# Patient Record
Sex: MALE | Race: OTHER | Employment: OTHER | URBAN - METROPOLITAN AREA
[De-identification: names, ages, dates, MRNs, and addresses within clinical notes are randomized per-mention and may not be internally consistent; named-entity substitution may affect disease eponyms.]

---

## 2022-02-03 ENCOUNTER — NEW PATIENT (OUTPATIENT)
Dept: URBAN - METROPOLITAN AREA CLINIC 13 | Facility: CLINIC | Age: 77
End: 2022-02-03

## 2022-02-03 DIAGNOSIS — H43.313: ICD-10-CM

## 2022-02-03 DIAGNOSIS — H52.11: ICD-10-CM

## 2022-02-03 PROCEDURE — 92250 FUNDUS PHOTOGRAPHY W/I&R: CPT

## 2022-02-03 PROCEDURE — 92004 COMPRE OPH EXAM NEW PT 1/>: CPT

## 2022-02-03 PROCEDURE — 92015 DETERMINE REFRACTIVE STATE: CPT

## 2022-02-03 ASSESSMENT — TONOMETRY
OS_IOP_MMHG: 15
OD_IOP_MMHG: 15

## 2022-02-03 ASSESSMENT — VISUAL ACUITY
OD_CC: 20/70
OU_CC: 20/60
OS_CC: 20/60
OD_SC: 20/150
OU_SC: 20/60-2
OS_SC: 20/60

## 2022-02-03 ASSESSMENT — KERATOMETRY
OD_K2POWER_DIOPTERS: 43.00
OS_AXISANGLE2_DEGREES: 45
OD_AXISANGLE2_DEGREES: 46
OD_K1POWER_DIOPTERS: 43.25
OS_K1POWER_DIOPTERS: 42.00
OD_AXISANGLE_DEGREES: 136
OS_K2POWER_DIOPTERS: 42.50
OS_AXISANGLE_DEGREES: 135

## 2022-02-03 NOTE — PATIENT DISCUSSION
Told patient if he updates glasses, he will still not be able to see as clearly. We can send referral out today for sx, or we can wait. It is  up to pt.

## 2022-02-23 ENCOUNTER — ESTABLISHED PATIENT (OUTPATIENT)
Dept: URBAN - METROPOLITAN AREA CLINIC 14 | Facility: CLINIC | Age: 77
End: 2022-02-23

## 2022-02-23 DIAGNOSIS — H25.13: ICD-10-CM

## 2022-02-23 PROCEDURE — 92136 OPHTHALMIC BIOMETRY: CPT

## 2022-02-23 PROCEDURE — 99214 OFFICE O/P EST MOD 30 MIN: CPT

## 2022-02-23 ASSESSMENT — VISUAL ACUITY
OD_BCVA: 20/40
OS_SC: 20/80
OS_BCVA: 20/50
OD_SC: 20/400

## 2022-02-23 ASSESSMENT — TONOMETRY
OD_IOP_MMHG: 10
OS_IOP_MMHG: 10

## 2022-02-23 ASSESSMENT — KERATOMETRY
OS_AXISANGLE_DEGREES: 135
OD_K2POWER_DIOPTERS: 43.00
OS_AXISANGLE2_DEGREES: 45
OD_AXISANGLE2_DEGREES: 46
OD_AXISANGLE_DEGREES: 136
OS_K1POWER_DIOPTERS: 42.00
OS_K2POWER_DIOPTERS: 42.50
OD_K1POWER_DIOPTERS: 43.25

## 2022-02-23 NOTE — PATIENT DISCUSSION
Visually significant.  Schedule cataract surgery OD (pt feels the right eye is worse) and then OS if symptoms persist.

## 2022-02-23 NOTE — PATIENT DISCUSSION
The patient desires to be less dependent on glasses. He would like to learn more about research study options.

## 2022-03-02 ENCOUNTER — CLINICAL TRIAL VISIT (OUTPATIENT)
Dept: URBAN - METROPOLITAN AREA CLINIC 14 | Facility: CLINIC | Age: 77
End: 2022-03-02

## 2022-03-02 DIAGNOSIS — H25.13: ICD-10-CM

## 2022-03-02 PROCEDURE — 99199CT CLINICAL TRIAL VISIT

## 2022-03-02 NOTE — PATIENT DISCUSSION
Patient does not qualify; therefore, has not been enrolled in the study. Patient not evaluated by Dr. Leti Allison. Research screening only.

## 2022-03-10 ENCOUNTER — POST-OP (OUTPATIENT)
Dept: URBAN - METROPOLITAN AREA CLINIC 14 | Facility: CLINIC | Age: 77
End: 2022-03-10

## 2022-03-10 DIAGNOSIS — Z96.1: ICD-10-CM

## 2022-03-10 DIAGNOSIS — H25.12: ICD-10-CM

## 2022-03-10 PROCEDURE — 99024 POSTOP FOLLOW-UP VISIT: CPT

## 2022-03-10 PROCEDURE — 92136 OPHTHALMIC BIOMETRY: CPT

## 2022-03-10 ASSESSMENT — TONOMETRY: OD_IOP_MMHG: 15

## 2022-03-10 ASSESSMENT — VISUAL ACUITY
OS_BCVA: 20/50
OD_SC: 20/40

## 2022-03-24 ENCOUNTER — POST-OP (OUTPATIENT)
Dept: URBAN - METROPOLITAN AREA CLINIC 13 | Facility: CLINIC | Age: 77
End: 2022-03-24

## 2022-03-24 DIAGNOSIS — Z96.1: ICD-10-CM

## 2022-03-24 PROCEDURE — 99211NC NO CHARGE VISIT

## 2022-03-24 ASSESSMENT — VISUAL ACUITY
OS_SC: 20/30-2
OU_SC: 20/25
OD_SC: 20/25-2

## 2022-03-24 ASSESSMENT — TONOMETRY
OD_IOP_MMHG: 14
OS_IOP_MMHG: 14

## 2022-04-14 ENCOUNTER — POST-OP (OUTPATIENT)
Dept: URBAN - METROPOLITAN AREA CLINIC 13 | Facility: CLINIC | Age: 77
End: 2022-04-14

## 2022-04-14 DIAGNOSIS — Z96.1: ICD-10-CM

## 2022-04-14 PROCEDURE — 99024 POSTOP FOLLOW-UP VISIT: CPT

## 2022-04-14 ASSESSMENT — VISUAL ACUITY
OD_SC: 20/25
OU_SC: 20/25
OS_SC: 20/50-1

## 2022-04-14 ASSESSMENT — TONOMETRY
OD_IOP_MMHG: 11
OS_IOP_MMHG: 11